# Patient Record
Sex: FEMALE
[De-identification: names, ages, dates, MRNs, and addresses within clinical notes are randomized per-mention and may not be internally consistent; named-entity substitution may affect disease eponyms.]

---

## 2021-08-30 DIAGNOSIS — M25.561 PAIN IN RIGHT KNEE: ICD-10-CM

## 2021-08-30 PROBLEM — Z00.00 ENCOUNTER FOR PREVENTIVE HEALTH EXAMINATION: Status: ACTIVE | Noted: 2021-08-30

## 2021-08-31 ENCOUNTER — APPOINTMENT (OUTPATIENT)
Dept: ORTHOPEDIC SURGERY | Facility: CLINIC | Age: 31
End: 2021-08-31
Payer: MEDICAID

## 2021-08-31 VITALS — WEIGHT: 120 LBS | HEIGHT: 61 IN | BODY MASS INDEX: 22.66 KG/M2 | RESPIRATION RATE: 16 BRPM

## 2021-08-31 DIAGNOSIS — Z78.9 OTHER SPECIFIED HEALTH STATUS: ICD-10-CM

## 2021-08-31 DIAGNOSIS — T14.8XXA OTHER INJURY OF UNSPECIFIED BODY REGION, INITIAL ENCOUNTER: ICD-10-CM

## 2021-08-31 PROCEDURE — 73562 X-RAY EXAM OF KNEE 3: CPT | Mod: RT

## 2021-08-31 PROCEDURE — 99203 OFFICE O/P NEW LOW 30 MIN: CPT

## 2021-09-01 NOTE — DISCUSSION/SUMMARY
[de-identified] : 31F with Right knee LFC bone bruise\par \par - Findings were discussed and treatment options reviewed. Patient wants to go back to judo.\par \par - Activity modification with noncontact sparring counseled\par - HEP and RX for PT given\par - Pain can take 3 months to resolve\par - if pain persists over next 8-12 weeks patient will f/u PRN pain

## 2021-09-01 NOTE — HISTORY OF PRESENT ILLNESS
[de-identified] : Referring Provider:\par Chief Complaint:\par Date of Injury/onset:\par \par 32 y/o female presents for an evaluation of right knee pain. She reports pain that has been ongoing for the past 4 weeks along the lateral aspect of the right knee which is currently 7/10 in severity. She reports she slipped in the bathtub striking her knee on the bathtub. She states she went to an outside provider and had Xray taken. She now comes to us for evaluation.She reports pain at 7/10 in severity for the past month.  She reports no prior injury.\par \par Please see intake sheet for additional details.\par \par \par -------------\par Review of Systems:\par Constitutional:         ENT:                      Eyes\par Good General Health        [Yes]     Hearing Loss/Ringing            [No]        Wear Glasses/contact       [No]\par Recent Weight Change     [No]       Sinus Problems                     [No]        Blurred/double Vision         [No]\par Night Sweats, Fevers       [No]       Nose Bleeds                         [No]        Eye Disease or Injury         [No]\par Fatigue                              [No]       Sore Throat/Voice Change   [No]        Glaucoma                           [No]\par Cardiovascular         Respiratory                    Gastrointestinal\par Chest Pain                         [No]       Shortness of Breath             [No]       Nausea/Vomiting                  [No]\par Palpitations                        [No]       Cough                                    [No]       Abdominal Pain                    [No]\par Heart Trouble                    [No]        Wheezing/Asthma                [No]       Rectal Bleeding                    [No]\par Swelling hands/feet          [No]       Coughing up Blood                [No]       Bowel Problems                   [No]\par Musculoskeletal         Neurological                     Integumentary\par Muscle Pain or Cramps     [No]       Frequent Headaches            [No]       Change in hair or nails         [No]\par Stiffness/swelling joints   [No]       Paralysis or Tremors             [No]       Rashes or itching                [No]\par Joint Pain                           [No]      Convulsions/Seizures            [No]       Breast Lump                        [No]\par Trouble Walking                 [No]      Numbness/Tingling                 [No]       Breast pain or discharge    [No]\par Endocrine        Hematologic/Lymphatic                    Allergic/Immunologic\par Excessive thirst/urination  [No]     Bruise easily                          [No]        Food Allergies                      [No]\par Thyroid Disease                [No]     Slow to heal                           [No]        Aspirin Allergies                  [No]\par Hormone Problem              [No]     Enlarged glands                     [No]        Antibiotic Allergies               [No]\par  - Male          - Female                     Psychiatric\par Blood in Urine                    [No]      Blood in Urine                         [No]       Insomnia                              [No]\par Kidney Stone                     [No]      Kidney Stone                          [No]      Confusion/memory loss       [No]\par Sexual Problems                [No]      Sexual Problems                    [No]       Depression                          [No]\par Testicular Pain                   [No]       Menstrual Problems               [No]\par \par Please refer to the attached intake form for additional history and details.\par \par

## 2021-09-01 NOTE — PHYSICAL EXAM
[de-identified] : General: Patient is awake and alert, demonstrates appropriate mood and affect, exhibits normal breathing and is in no acute distress.\par Constitutional: Well appearing in no apparent distress\par Skin: The skin is intact, warm, pink, and dry over the area examined.\par Lymph: There is no lymphedema\par Cardiovascular: There is brisk capillary refill in the digits of the affected extremity. They are symmetric pulses in the bilateral upper and lower extremities. \par Respiratory: The patient is in no apparent respiratory distress. They're taking full deep breaths without use of accessory muscles or evidence of audible wheezes or stridor without the use of a stethoscope. \par Neurological: 5/5 motor strength in the main muscle groups of bilateral upper extremities, sensory intact in bilateral upper extremities\par Musculoskeletal:. normal gait for age. good posture. normal clinical alignment in upper and lower extremities. normal clinical alignment of the spine. full range of motion in bilateral upper and lower extremities except as noted below\par \par Gait: [normal nonantalgic gait, normal abelardo, heel strike and toe off]\par \par Range of Motion: \par Right:                                                                            Left:\par Active [0-130] / Passive [0-130]                                Active [0-130] / Passive [0-130]\par \par Right Knee\par \par Skin: \par Intact\par Erythema [No]\par Effusion [No]\par \par Patellofemoral: \par TTP Quad Insertion [No]\par TTP Distal Pole Patella: [No]\par Patellar tenderness [No]\par Patellar grind  [negative]\par Patellar compression test [negative] \par Tracking: [normal]\par J-sign:  [negative]\par Apprehension  [negative]\par Lateral translation:  [2] quadrants \par Medial translation: [2] quadrants \par \par Strength: [5]/5 extension,  [5]/5 flexion\par \par Tenderness: lateral femoral condyle focally lateral to the patella\par \par Stability: \par Varus stress: [stable]  at 0 and 30 degrees\par Valgus stress: [stable] at 0 and 30 degrees\par Lachman: [1A]\par Anterior drawer:  [negative]\par Posterior drawer:  [negative] \par Pivot-shift:  [negative] \par \par Meniscus:\par Jointline tenderness:  [No] \par Sravanthi:  [negative] \par Thessaly: [negative] \par \par Motor: EHL/FHL/TA/Gs intact\par \par Sens: S/S/T/SPN/DPN intact to light touch\par \par Vasc: 2+ DP and PT Pulses\par \par  [de-identified] : Date: 08/31/2021 \par Location: [facility], 4th Floor\par Body part: [Right Knee \par Views: 3 Views: AP bilateral WB knees, Lateral, Merchant\par Impression: No fracture or dislocation, joint space preserved\par